# Patient Record
Sex: FEMALE | Race: ASIAN | Employment: FULL TIME | URBAN - METROPOLITAN AREA
[De-identification: names, ages, dates, MRNs, and addresses within clinical notes are randomized per-mention and may not be internally consistent; named-entity substitution may affect disease eponyms.]

---

## 2024-02-17 ENCOUNTER — OFFICE VISIT (OUTPATIENT)
Age: 39
End: 2024-02-17

## 2024-02-17 VITALS
SYSTOLIC BLOOD PRESSURE: 124 MMHG | OXYGEN SATURATION: 97 % | HEART RATE: 73 BPM | DIASTOLIC BLOOD PRESSURE: 85 MMHG | TEMPERATURE: 99 F | WEIGHT: 137.6 LBS | HEIGHT: 60 IN | BODY MASS INDEX: 27.01 KG/M2

## 2024-02-17 DIAGNOSIS — H01.00B BLEPHARITIS OF UPPER AND LOWER EYELIDS OF BOTH EYES, UNSPECIFIED TYPE: Primary | ICD-10-CM

## 2024-02-17 DIAGNOSIS — H01.00A BLEPHARITIS OF UPPER AND LOWER EYELIDS OF BOTH EYES, UNSPECIFIED TYPE: Primary | ICD-10-CM

## 2024-02-17 RX ORDER — NEOMYCIN/POLYMYXIN B/DEXAMETHA 3.5-10K-.1
0.5 OINTMENT (GRAM) OPHTHALMIC (EYE) 4 TIMES DAILY
Qty: 3.5 G | Refills: 0 | Status: SHIPPED | OUTPATIENT
Start: 2024-02-17

## 2024-02-17 NOTE — PROGRESS NOTES
Chief Complaint   Patient presents with    New Patient    Itchy Eye     Swollen eyes and lids are itchy/crusted for about a week.         Eye Problem   Both eyes are affected. This is a new problem. The current episode started in the past 7 days. The problem occurs daily. The problem has been unchanged. There was no injury mechanism. The pain is mild. There is No known exposure to pink eye. She Does not wear contacts. Associated symptoms include itching (on bilateral upper and lower eyelids- on edge- swelling of rt upper eyelid). Pertinent negatives include no blurred vision, eye discharge, eye redness, foreign body sensation or photophobia. Treatments tried: vaseline.       Past Medical History:   Diagnosis Date    Headache        No past surgical history on file.    Social History     Tobacco Use    Smoking status: Light Smoker    Smokeless tobacco: Never   Substance Use Topics    Alcohol use: Yes    Drug use: Never        Allergies   Allergen Reactions    Covid-19 (Mrna) Vaccine Hives        Review of Systems   Eyes:  Positive for itching (on bilateral upper and lower eyelids- on edge- swelling of rt upper eyelid). Negative for blurred vision, photophobia, discharge and redness.   All other systems reviewed and are negative.       /85 (Site: Left Upper Arm, Position: Sitting, Cuff Size: Medium Adult)   Pulse 73   Temp 99 °F (37.2 °C) (Oral)   Ht 1.524 m (5')   Wt 62.4 kg (137 lb 9.6 oz)   SpO2 97%   BMI 26.87 kg/m²      Physical Exam  Vitals and nursing note reviewed.   Constitutional:       General: She is not in acute distress.     Appearance: Normal appearance. She is not ill-appearing.   Eyes:      General:         Right eye: No discharge or hordeolum.         Left eye: No discharge or hordeolum.      Extraocular Movements: Extraocular movements intact.      Conjunctiva/sclera:      Right eye: Right conjunctiva is not injected.      Left eye: Left conjunctiva is not injected.      Pupils: Pupils